# Patient Record
Sex: MALE | Race: WHITE | NOT HISPANIC OR LATINO | Employment: FULL TIME | URBAN - METROPOLITAN AREA
[De-identification: names, ages, dates, MRNs, and addresses within clinical notes are randomized per-mention and may not be internally consistent; named-entity substitution may affect disease eponyms.]

---

## 2019-03-27 ENCOUNTER — OFFICE VISIT (OUTPATIENT)
Dept: URGENT CARE | Facility: CLINIC | Age: 29
End: 2019-03-27
Payer: COMMERCIAL

## 2019-03-27 VITALS
BODY MASS INDEX: 28.63 KG/M2 | SYSTOLIC BLOOD PRESSURE: 148 MMHG | DIASTOLIC BLOOD PRESSURE: 90 MMHG | RESPIRATION RATE: 18 BRPM | HEART RATE: 70 BPM | HEIGHT: 70 IN | TEMPERATURE: 98.7 F | WEIGHT: 200 LBS | OXYGEN SATURATION: 96 %

## 2019-03-27 DIAGNOSIS — R10.13 EPIGASTRIC PAIN: Primary | ICD-10-CM

## 2019-03-27 PROCEDURE — G0382 LEV 3 HOSP TYPE B ED VISIT: HCPCS | Performed by: EMERGENCY MEDICINE

## 2019-03-27 NOTE — PATIENT INSTRUCTIONS
Abdominal Pain   WHAT YOU NEED TO KNOW:   Abdominal pain can be dull, achy, or sharp  You may have pain in one area of your abdomen, or in your entire abdomen  Your pain may be caused by a condition such as constipation, food sensitivity or poisoning, infection, or a blockage  Abdominal pain can also be from a hernia, appendicitis, or an ulcer  Liver, gallbladder, or kidney conditions can also cause abdominal pain  The cause of your abdominal pain may be unknown  DISCHARGE INSTRUCTIONS:   Return to the emergency department if:   · You have new chest pain or shortness of breath  · You have pulsing pain in your upper abdomen or lower back that suddenly becomes constant  · Your pain is in the right lower abdominal area and worsens with movement  · You have a fever over 100 4°F (38°C) or shaking chills  · You are vomiting and cannot keep food or liquids down  · Your pain does not improve or gets worse over the next 8 to 12 hours  · You see blood in your vomit or bowel movements, or they look black and tarry  · Your skin or the whites of your eyes turn yellow  · You are a woman and have a large amount of vaginal bleeding that is not your monthly period  Contact your healthcare provider if:   · You have pain in your lower back  · You are a man and have pain in your testicles  · You have pain when you urinate  · You have questions or concerns about your condition or care  Follow up with your healthcare provider within 24 hours or as directed:  Write down your questions so you remember to ask them during your visits  Medicines:   · Medicines  may be given to calm your stomach and prevent vomiting or to decrease pain  Ask how to take pain medicine safely  · Take your medicine as directed  Contact your healthcare provider if you think your medicine is not helping or if you have side effects  Tell him of her if you are allergic to any medicine   Keep a list of the medicines, vitamins, and herbs you take  Include the amounts, and when and why you take them  Bring the list or the pill bottles to follow-up visits  Carry your medicine list with you in case of an emergency  © 2017 [employer]  Information is for End User's use only and may not be sold, redistributed or otherwise used for commercial purposes  All illustrations and images included in CareNotes® are the copyrighted property of [employer]  or [employer] Gallo  The above information is an  only  It is not intended as medical advice for individual conditions or treatments  Talk to your doctor, nurse or pharmacist before following any medical regimen to see if it is safe and effective for you  Gastritis   WHAT YOU NEED TO KNOW:   Gastritis is inflammation or irritation of the lining of your stomach  DISCHARGE INSTRUCTIONS:   Call 911 for any of the following:   · You develop chest pain or shortness of breath  Return to the emergency department if:   · You vomit blood  · You have black or bloody bowel movements  · You have severe stomach or back pain  Contact your healthcare provider if:   · You have a fever  · You have new or worsening symptoms, even after treatment  · You have questions or concerns about your condition or care  Medicines:   · Medicines  may be given to help treat a bacterial infection or decrease stomach acid  · Take your medicine as directed  Contact your healthcare provider if you think your medicine is not helping or if you have side effects  Tell him or her if you are allergic to any medicine  Keep a list of the medicines, vitamins, and herbs you take  Include the amounts, and when and why you take them  Bring the list or the pill bottles to follow-up visits  Carry your medicine list with you in case of an emergency  Manage or prevent gastritis:   · Do not smoke    Nicotine and other chemicals in cigarettes and cigars can make your symptoms worse and cause lung damage  Ask your healthcare provider for information if you currently smoke and need help to quit  E-cigarettes or smokeless tobacco still contain nicotine  Talk to your healthcare provider before you use these products  · Do not drink alcohol  Alcohol can prevent healing and make your gastritis worse  Talk to your healthcare provider if you need help to stop drinking  · Do not take NSAIDs or aspirin unless directed  These and similar medicines can cause irritation  If your healthcare provider says it is okay to take NSAIDs, take them with food  · Do not eat foods that cause irritation  Foods such as oranges and salsa can cause burning or pain  Eat a variety of healthy foods  Examples include fruits (not citrus), vegetables, low-fat dairy products, beans, whole-grain breads, and lean meats and fish  Try to eat small meals, and drink water with your meals  Do not eat for at least 3 hours before you go to bed  · Find ways to relax and decrease stress  Stress can increase stomach acid and make gastritis worse  Activities such as yoga, meditation, or listening to music can help you relax  Spend time with friends, or do things you enjoy  Follow up with your healthcare provider as directed: You may need ongoing tests or treatment, or referral to a gastroenterologist  Write down your questions so you remember to ask them during your visits  © 2017 [address] Information is for End User's use only and may not be sold, redistributed or otherwise used for commercial purposes  All illustrations and images included in CareNotes® are the copyrighted property of [employer]  or [employer]  The above information is an  only  It is not intended as medical advice for individual conditions or treatments  Talk to your doctor, nurse or pharmacist before following any medical regimen to see if it is safe and effective for you

## 2019-03-27 NOTE — PROGRESS NOTES
[facility]        NAME: Gabby Shipman is a 33 y o  male  : 1990    MRN: [id number]  DATE: 2019  TIME: 5:52 PM    Assessment and Plan   Epigastric pain [R10 13]  1  Epigastric pain           Patient Instructions     Patient Instructions   Abdominal Pain   WHAT YOU NEED TO KNOW:   Abdominal pain can be dull, achy, or sharp  You may have pain in one area of your abdomen, or in your entire abdomen  Your pain may be caused by a condition such as constipation, food sensitivity or poisoning, infection, or a blockage  Abdominal pain can also be from a hernia, appendicitis, or an ulcer  Liver, gallbladder, or kidney conditions can also cause abdominal pain  The cause of your abdominal pain may be unknown  DISCHARGE INSTRUCTIONS:   Return to the emergency department if:   · You have new chest pain or shortness of breath  · You have pulsing pain in your upper abdomen or lower back that suddenly becomes constant  · Your pain is in the right lower abdominal area and worsens with movement  · You have a fever over 100 4°F (38°C) or shaking chills  · You are vomiting and cannot keep food or liquids down  · Your pain does not improve or gets worse over the next 8 to 12 hours  · You see blood in your vomit or bowel movements, or they look black and tarry  · Your skin or the whites of your eyes turn yellow  · You are a woman and have a large amount of vaginal bleeding that is not your monthly period  Contact your healthcare provider if:   · You have pain in your lower back  · You are a man and have pain in your testicles  · You have pain when you urinate  · You have questions or concerns about your condition or care  Follow up with your healthcare provider within 24 hours or as directed:  Write down your questions so you remember to ask them during your visits  Medicines:   · Medicines  may be given to calm your stomach and prevent vomiting or to decrease pain   Ask how to take pain medicine safely  · Take your medicine as directed  Contact your healthcare provider if you think your medicine is not helping or if you have side effects  Tell him of her if you are allergic to any medicine  Keep a list of the medicines, vitamins, and herbs you take  Include the amounts, and when and why you take them  Bring the list or the pill bottles to follow-up visits  Carry your medicine list with you in case of an emergency  © 2017 [employer]  Information is for End User's use only and may not be sold, redistributed or otherwise used for commercial purposes  All illustrations and images included in CareNotes® are the copyrighted property of [employer]  or [employer]ermit Sabino  The above information is an  only  It is not intended as medical advice for individual conditions or treatments  Talk to your doctor, nurse or pharmacist before following any medical regimen to see if it is safe and effective for you  Follow up with PCP in 3-5 days  Proceed to  ER if symptoms worsen  Chief Complaint     Chief Complaint   Patient presents with    Abdominal Pain     Epigastric and RUQ pain after eating meals          History of Present Illness       Patient complains of recurrent postprandial dull epigastric pain for past several months  He admits to great deal life stress in that period time including being kicked out of his house, possibility of pregnancy in his girlfriend, etc       Review of Systems   Review of Systems   Constitutional: Positive for appetite change and chills  Negative for fever  Respiratory: Negative for cough, shortness of breath and wheezing  Cardiovascular: Negative for chest pain and palpitations  Gastrointestinal: Positive for abdominal pain  Negative for abdominal distention, anal bleeding, blood in stool, constipation, diarrhea, nausea, rectal pain and vomiting  Genitourinary: Negative for flank pain  Musculoskeletal: Negative for back pain and neck stiffness  Skin: Negative for pallor  Neurological: Negative for syncope, light-headedness and headaches  Current Medications     No current outpatient medications on file  Current Allergies     Allergies as of 03/27/2019    (No Known Allergies)            The following portions of the patient's history were reviewed and updated as appropriate: allergies, current medications, past family history, past medical history, past social history, past surgical history and problem list      Past Medical History:   Diagnosis Date    ADHD (attention deficit hyperactivity disorder)        Past Surgical History:   Procedure Laterality Date    NO PAST SURGERIES         Family History   Problem Relation Age of Onset    Diabetes Mother          Medications have been verified  Objective   /90   Pulse 70   Temp 98 7 °F (37 1 °C) (Tympanic)   Resp 18   Ht 5' 10" (1 778 m)   Wt 90 7 kg (200 lb)   SpO2 96%   BMI 28 70 kg/m²        Physical Exam     Physical Exam   Constitutional: He is oriented to person, place, and time  He appears well-developed and well-nourished  No distress  HENT:   Head: Normocephalic and atraumatic  Cardiovascular: Normal rate, regular rhythm and normal heart sounds  Pulmonary/Chest: Effort normal and breath sounds normal    Abdominal: Soft  Bowel sounds are normal  He exhibits no distension and no mass  There is no rebound and no guarding  Mild tenderness midepigastric region, no rebound or guarding  Musculoskeletal: Normal range of motion  Neurological: He is alert and oriented to person, place, and time  Skin: Skin is warm and dry  He is not diaphoretic  No pallor  Psychiatric: He has a normal mood and affect  His behavior is normal  Judgment and thought content normal    Nursing note and vitals reviewed